# Patient Record
Sex: MALE | URBAN - METROPOLITAN AREA
[De-identification: names, ages, dates, MRNs, and addresses within clinical notes are randomized per-mention and may not be internally consistent; named-entity substitution may affect disease eponyms.]

---

## 2022-12-22 ENCOUNTER — OFFICE VISIT (OUTPATIENT)
Dept: URBAN - METROPOLITAN AREA CLINIC 98 | Facility: CLINIC | Age: 37
End: 2022-12-22
Payer: COMMERCIAL

## 2022-12-22 ENCOUNTER — WEB ENCOUNTER (OUTPATIENT)
Dept: URBAN - METROPOLITAN AREA CLINIC 98 | Facility: CLINIC | Age: 37
End: 2022-12-22

## 2022-12-22 VITALS
TEMPERATURE: 97.9 F | BODY MASS INDEX: 30.98 KG/M2 | SYSTOLIC BLOOD PRESSURE: 138 MMHG | DIASTOLIC BLOOD PRESSURE: 98 MMHG | HEART RATE: 76 BPM | WEIGHT: 216.4 LBS | HEIGHT: 70 IN

## 2022-12-22 DIAGNOSIS — D50.0 IRON DEFICIENCY ANEMIA DUE TO CHRONIC BLOOD LOSS: ICD-10-CM

## 2022-12-22 PROCEDURE — 99212 OFFICE O/P EST SF 10 MIN: CPT | Performed by: INTERNAL MEDICINE

## 2022-12-22 NOTE — HPI-TODAY'S VISIT:
Here with Geraldine Alegre, to follow up for anemia  Had been hospitalized at [facility] 1 year ago.  Needed transfusions had scopes - EGD and colon and pillcam : unrevealing  no overt bleeding feels well  no GI complaints He recently went to [facility] last month for non - GI complaint - labs were done - Hb 13

## 2022-12-25 ENCOUNTER — DASHBOARD ENCOUNTERS (OUTPATIENT)
Age: 37
End: 2022-12-25

## 2022-12-25 PROBLEM — 724556004: Status: ACTIVE | Noted: 2022-12-25

## 2023-01-18 ENCOUNTER — OFFICE VISIT (OUTPATIENT)
Dept: URBAN - METROPOLITAN AREA CLINIC 96 | Facility: CLINIC | Age: 38
End: 2023-01-18